# Patient Record
Sex: MALE | Race: WHITE
[De-identification: names, ages, dates, MRNs, and addresses within clinical notes are randomized per-mention and may not be internally consistent; named-entity substitution may affect disease eponyms.]

---

## 2019-02-15 NOTE — ERD
ER Documentation


Chief Complaint


Chief Complaint





BODY ACHES





HPI


This is a 38-year-old male who presents here in emergency department with 


complaints of generalized body ache, throat pain for about 3 days, coughing for 


about 2 days.





Denies headache, head injury, loss of consciousness, dizziness, neck pain, neck 


stiffness, throat pain, difficulty swallowing, difficulty breathing lying flat, 


shoulder pain, chest pain, back pain, abdominal pain, nausea, vomiting, 


constipation, diarrhea, urinary symptoms, loss of bowel and bladder control, 


trauma, injury, falls, difficulty walking due to pain, numbness or tingling 


sensation, calf pain, recent travel, recent major surgery in the last 3 weeks, 


calf pain, recent long travel, recent exposure to any illness, recent antibiotic


use in the last 3 months, fever, chills, seizures.





Past medical history:


Surgical history:


Social: Denies smoking, use of alcoholic beverages, use of illegal drugs.





ROS


All systems reviewed and are negative except as per history of present illness.





Medications


Home Meds


Active Scripts


Benzonatate* (Tessalon Perle*) 100 Mg Capsule, 100 MG PO Q8H PRN for COUGH, #20 


CAP


   Prov:PASILABANMARYAR F         2/15/19


Ibuprofen* (Motrin*) 600 Mg Tab, 600 MG PO Q6H PRN for PAIN AND OR ELEVATED 


TEMP, #30 TAB


   Prov:PASILABANLIZZY F         2/15/19


Ondansetron Hcl* (Zofran*) 4 Mg Tablet, 4 MG PO Q8H PRN for NAUSEA AND/OR 


VOMITING, #30 TAB


   Prov:PASILABANLIZZY F         2/15/19


Amoxicillin/Potassium Clav (Amox-Clav 875-125 mg Tablet) 875-125 mg Tab, 1 TAB 


PO BID for 10 Days, #20 TAB


   Prov:PASILABANMARYAR F         2/15/19





Allergies


Allergies:  


Coded Allergies:  


     No Known Allergy (Unverified , 12/25/12)





PMhx/Soc


Medical and Surgical Hx:  pt denies Medical Hx, pt denies Surgical Hx


History of Surgery:  No


Anesthesia Reaction:  No


Hx Neurological Disorder:  No


Hx Respiratory Disorders:  No


Hx Cardiac Disorders:  No


Hx Psychiatric Problems:  No


Hx Miscellaneous Medical Probl:  No


Hx Alcohol Use:  No


Hx Substance Use:  No


Hx Tobacco Use:  No


Smoking Status:  Never smoker





Physical Exam


Vitals





Physical Exam


Const:   No acute distress


Head:   Atraumatic 


Eyes:    Normal Conjunctiva


ENT:    Normal External Ears, Nose and Mouth.  Throat: Uvula is in midline and 


non-displaced. Tonsils are +2 with redness but no exudates. Tolerating 


secretions. There is frontal and maxillary sinus tenderness to palpation. 


Neck:               Full range of motion. No meningismus.


Resp:   Clear to auscultation bilaterally


Cardio:   Regular rate and rhythm, no murmurs


Abd:    Soft, non tender, non distended. Normal bowel sounds


Skin:   No petechiae or rashes


Back:   No midline or flank tenderness


Ext:    No cyanosis, or edema


Neur:   Awake and alert. No neurological deficits. 


Psych:    Normal Mood and Affect


Results 24 hrs





Current Medications


 Medications
   Dose
          Sig/Tenisha
       Start Time
   Status  Last


 (Trade)       Ordered        Route
 PRN     Stop Time              Admin
Dose


                              Reason                                Admin


 Ondansetron    4 mg           ONCE  STAT
    2/15/19       DC           2/15/19


HCl
  (Zofran                 ODT
           05:35
                       05:42



Odt)                                         2/15/19 05:37


                10 mg          ONCE  ONCE
    2/15/19       DC           2/15/19


Dexamethasone                 IM
            06:00
                       05:43




  (Decadron)                                2/15/19 06:01


 Ibuprofen
     800 mg         ONCE  ONCE
    2/15/19       DC           2/15/19


(Motrin)                      PO
            06:00
                       05:42



                                             2/15/19 06:01








Procedures/MDM


Diagnostic tests: Clinical exam.





Treatment: Dexamethasone.  Motrin.  Zofran.





Re-evaluation: Denies pain. No vomiting. 





Differential diagnosis


I have low suspicion for sepsis, meningitis, mastoiditis, peritonsillar abscess.








Final diagnosis: Tonsillitis.  Bronchitis.





Prescription: Augmentin.  Tylenol.  Motrin.





Follow-up with PCP in the next 24-48 hours.  Come back here in the emergency 


department for any new symptoms or any worsening symptoms.  





All questions and concerns were answered.  Patient and family members verbalized


understanding and agreed with plan of care.  





Hemodynamically stable on discharge.





Departure


Diagnosis:  


   Primary Impression:  


   Influenza-like symptoms


   Additional Impressions:  


   Tonsillitis


   Sinusitis


Condition:  Stable





Additional Instructions:  


Follow-up with PCP in the next 24-48 hours.  Come back here in the emergency 


department for any new symptoms or any worsening symptoms.











LIZZY MOSELEY               Feb 15, 2019 05:35

## 2019-04-13 ENCOUNTER — HOSPITAL ENCOUNTER (EMERGENCY)
Dept: HOSPITAL 10 - FTE | Age: 39
Discharge: HOME | End: 2019-04-13
Payer: COMMERCIAL

## 2019-04-13 ENCOUNTER — HOSPITAL ENCOUNTER (EMERGENCY)
Dept: HOSPITAL 91 - FTE | Age: 39
Discharge: HOME | End: 2019-04-13
Payer: COMMERCIAL

## 2019-04-13 VITALS — DIASTOLIC BLOOD PRESSURE: 81 MMHG | SYSTOLIC BLOOD PRESSURE: 122 MMHG | RESPIRATION RATE: 19 BRPM | HEART RATE: 85 BPM

## 2019-04-13 VITALS
HEIGHT: 62 IN | BODY MASS INDEX: 22.52 KG/M2 | BODY MASS INDEX: 22.52 KG/M2 | WEIGHT: 122.36 LBS | HEIGHT: 62 IN | WEIGHT: 122.36 LBS

## 2019-04-13 DIAGNOSIS — R19.7: Primary | ICD-10-CM

## 2019-04-13 PROCEDURE — 99283 EMERGENCY DEPT VISIT LOW MDM: CPT

## 2019-04-13 NOTE — ERD
ER Documentation


Chief Complaint


Chief Complaint





DIARRHEA X 6 DAYS





HPI


38-year-old male presenting with diarrhea times 6 days.  Patient states he has 


had generalized body aches.  Denies any bloody stool.  Has not had any medicine 


today and denies fevers.  He denies any recent travel or sick contacts.  Denies 


medical problems.  NKDA.  Surgical history denies.  Social history denies.  


Denies any vomiting.





ROS


All systems reviewed and are negative except as per history of present illness.





Medications


Home Meds


Active Scripts


Loperamide Hcl* (Imodium*) 2 Mg Capsule, 2 MG PO .WITH EACH DIARRHEA PRN for 


DIARRHEA, #30 CAP


   MAX 16 mg/day


   Prov:TEVIN NARAYAN PA-C         4/13/19


Ondansetron (Ondansetron Odt) 4 Mg Tab.rapdis, 4 MG PO Q6H PRN for NAUSEA AND/OR


VOMITING, #10 TAB


   Prov:TEVIN NARAYAN PA-C         4/13/19


Benzonatate* (Tessalon Perle*) 100 Mg Capsule, 100 MG PO Q8H PRN for COUGH, #20 


CAP


   Prov:PASILABANLIZZY F         2/15/19


Ibuprofen* (Motrin*) 600 Mg Tab, 600 MG PO Q6H PRN for PAIN AND OR ELEVATED 


TEMP, #30 TAB


   Prov:LIZZY MOSELEY F         2/15/19


Ondansetron Hcl* (Zofran*) 4 Mg Tablet, 4 MG PO Q8H PRN for NAUSEA AND/OR 


VOMITING, #30 TAB


   Prov:KARENILALIZZY GARRETT F         2/15/19


Amoxicillin/Potassium Clav (Amox-Clav 875-125 mg Tablet) 875-125 mg Tab, 1 TAB 


PO BID for 10 Days, #20 TAB


   Prov:PASILAMARY GARRETTAR F         2/15/19





Allergies


Allergies:  


Coded Allergies:  


     No Known Allergy (Unverified , 12/25/12)





PMhx/Soc


History of Surgery:  No


Anesthesia Reaction:  No


Hx Neurological Disorder:  No


Hx Respiratory Disorders:  No


Hx Cardiac Disorders:  No


Hx Psychiatric Problems:  No


Hx Miscellaneous Medical Probl:  No


Hx Alcohol Use:  No


Hx Substance Use:  No


Hx Tobacco Use:  No





FmHx


Family History:  No diabetes, No coronary disease, No other





Physical Exam


Vitals





Vital Signs


  Date      Temp  Pulse  Resp  B/P (MAP)   Pulse Ox  O2          O2 Flow    FiO2


Time                                                 Delivery    Rate


   4/13/19  97.9     85    19      122/81        99


     11:41                           (95)





Physical Exam


GENERAL: The patient is well-appearing, well-nourished, in no acute distress


HEENT: Atraumatic.  Conjunctivae are pink.  Pupils equal, round, and reactive to


light.  There is no scleral icterus.  Tympanic membranes clear bilaterally.  


Oropharynx clear.  


NECK: C-spine is soft and supple.  There is no meningismus.  There is no 


cervical lymphadenopathy.  


CHEST: Clear to auscultation bilaterally.  There are no rales, wheezes or 


rhonchi.


HEART: Regular rate and rhythm.  No murmurs, clicks, rubs or gallops


ABDOMEN:Soft, nondistended.  General tenderness to palpation with no focal exam.


 No rebound tenderness.  No organomegaly.  No distention





Procedures/MDM


MDM: 38-year-old male presenting with diarrhea.  I believe patient likely has 


viral syndrome.  Vitals are stable and patient exam is non-concerning.  Patient 


did not have pain with jumping.  I considered diverticulitis or acute abdominal 


emergency including but not limited to appendicitis or colitis however have low 


suspicion.  I do not feel blood work or imaging is indicated.  Patient is 


discharged with supportive medications and told to follow-up with primary care 


within 1-2 days for close evaluation.  All questions answered at discharge





Departure


Diagnosis:  


   Primary Impression:  


   Diarrhea


Condition:  Stable


Patient Instructions:  Self-Care for Vomiting and Diarrhea


Referrals:  


[facility]


YOU HAVE RECEIVED A MEDICAL SCREENING EXAM AND THE RESULTS INDICATE THAT YOU DO 


NOT HAVE A CONDITION THAT REQUIRES URGENT TREATMENT IN THE EMERGENCY DEPARTMENT.





FURTHER EVALUATION AND TREATMENT OF YOUR CONDITION CAN WAIT UNTIL YOU ARE SEEN 


IN YOUR DOCTORS OFFICE WITHIN THE NEXT 1-2 DAYS. IT IS YOUR RESPONSIBILITY TO 


MAKE AN APPOINTMENT FOR FOLOW-UP CARE.





IF YOU HAVE A PRIMARY DOCTOR


--you should call your primary doctor and schedule an appointment





IF YOU DO NOT HAVE A PRIMARY DOCTOR YOU CAN CALL OUR PHYSICIAN REFERRAL HOTLINE 


AT


 [phone] 





IF YOU CAN NOT AFFORD TO SEE A PHYSICIAN YOU CAN CHOSE FROM THE FOLLOWING 


[facility]





[facility] [phone] [address] [facility] [place] [facility] [phone] [address] [facility]. [facility] [phone] [address] [facility]. [facility] [phone] [address] [facility]. [facility] [phone] [address] [facility]. [facility] [phone] [address]





Additional Instructions:  


FOLLOW UP WITH YOUR PRIMARY CARE PHYSICIAN TOMORROW.Return to this facility if 


you are not improving as expected.











TEVIN NARAYAN PA-C       Apr 13, 2019 13:12